# Patient Record
Sex: FEMALE | Race: WHITE
[De-identification: names, ages, dates, MRNs, and addresses within clinical notes are randomized per-mention and may not be internally consistent; named-entity substitution may affect disease eponyms.]

---

## 2018-01-24 ENCOUNTER — HOSPITAL ENCOUNTER (INPATIENT)
Dept: HOSPITAL 96 - M.ERS | Age: 38
LOS: 2 days | Discharge: HOME | DRG: 392 | End: 2018-01-26
Attending: SURGERY | Admitting: SURGERY
Payer: MEDICAID

## 2018-01-24 VITALS — SYSTOLIC BLOOD PRESSURE: 152 MMHG | DIASTOLIC BLOOD PRESSURE: 102 MMHG

## 2018-01-24 VITALS — DIASTOLIC BLOOD PRESSURE: 79 MMHG | SYSTOLIC BLOOD PRESSURE: 146 MMHG

## 2018-01-24 VITALS — DIASTOLIC BLOOD PRESSURE: 82 MMHG | SYSTOLIC BLOOD PRESSURE: 133 MMHG

## 2018-01-24 VITALS — SYSTOLIC BLOOD PRESSURE: 134 MMHG | DIASTOLIC BLOOD PRESSURE: 79 MMHG

## 2018-01-24 VITALS — DIASTOLIC BLOOD PRESSURE: 70 MMHG | SYSTOLIC BLOOD PRESSURE: 132 MMHG

## 2018-01-24 VITALS — HEIGHT: 65.98 IN | BODY MASS INDEX: 36 KG/M2 | WEIGHT: 224 LBS

## 2018-01-24 VITALS — DIASTOLIC BLOOD PRESSURE: 102 MMHG | SYSTOLIC BLOOD PRESSURE: 152 MMHG

## 2018-01-24 DIAGNOSIS — K63.5: ICD-10-CM

## 2018-01-24 DIAGNOSIS — F17.210: ICD-10-CM

## 2018-01-24 DIAGNOSIS — N83.202: ICD-10-CM

## 2018-01-24 DIAGNOSIS — Z90.49: ICD-10-CM

## 2018-01-24 DIAGNOSIS — K52.9: Primary | ICD-10-CM

## 2018-01-24 DIAGNOSIS — K64.8: ICD-10-CM

## 2018-01-24 DIAGNOSIS — K63.89: ICD-10-CM

## 2018-01-24 DIAGNOSIS — K37: ICD-10-CM

## 2018-01-24 DIAGNOSIS — D27.0: ICD-10-CM

## 2018-01-24 LAB
ABSOLUTE BASOPHILS: 0.1 THOU/UL (ref 0–0.2)
ABSOLUTE EOSINOPHILS: 0.2 THOU/UL (ref 0–0.7)
ABSOLUTE MONOCYTES: 0.6 THOU/UL (ref 0–1.2)
ALBUMIN SERPL-MCNC: 2.7 G/DL (ref 3.4–5)
ALP SERPL-CCNC: 81 U/L (ref 46–116)
ALT SERPL-CCNC: 23 U/L (ref 30–65)
AMP/METHAMP: POSITIVE
ANION GAP SERPL CALC-SCNC: 6 MMOL/L (ref 7–16)
AST SERPL-CCNC: 14 U/L (ref 15–37)
BASOPHILS NFR BLD AUTO: 0.9 %
BILIRUB SERPL-MCNC: 0.4 MG/DL
BILIRUB UR-MCNC: NEGATIVE MG/DL
BUN SERPL-MCNC: 13 MG/DL (ref 7–18)
CALCIUM SERPL-MCNC: 8.6 MG/DL (ref 8.5–10.1)
CHLORIDE SERPL-SCNC: 102 MMOL/L (ref 98–107)
CO2 SERPL-SCNC: 29 MMOL/L (ref 21–32)
COLOR UR: YELLOW
CREAT SERPL-MCNC: 0.7 MG/DL (ref 0.6–1.3)
EOSINOPHIL NFR BLD: 1.8 %
GLUCOSE SERPL-MCNC: 169 MG/DL (ref 70–99)
GRANULOCYTES NFR BLD MANUAL: 72.5 %
HCT VFR BLD CALC: 39.3 % (ref 37–47)
HGB BLD-MCNC: 13.9 GM/DL (ref 12–15)
KETONES UR STRIP-MCNC: NEGATIVE MG/DL
LIPASE: 392 U/L (ref 73–393)
LYMPHOCYTES # BLD: 2.1 THOU/UL (ref 0.8–5.3)
LYMPHOCYTES NFR BLD AUTO: 19.3 %
MCH RBC QN AUTO: 30.5 PG (ref 26–34)
MCHC RBC AUTO-ENTMCNC: 35.3 G/DL (ref 28–37)
MCV RBC: 86.4 FL (ref 80–100)
MONOCYTES NFR BLD: 5.5 %
MPV: 6.6 FL. (ref 7.2–11.1)
NEUTROPHILS # BLD: 7.7 THOU/UL (ref 1.6–8.1)
NUCLEATED RBCS: 0 /100WBC
PLATELET COUNT*: 405 THOU/UL (ref 150–400)
POTASSIUM SERPL-SCNC: 3.9 MMOL/L (ref 3.5–5.1)
PROT SERPL-MCNC: 7 G/DL (ref 6.4–8.2)
PROT UR QL STRIP: NEGATIVE
RBC # BLD AUTO: 4.56 MIL/UL (ref 4.2–5)
RBC # UR STRIP: NEGATIVE /UL
RDW-CV: 12.9 % (ref 10.5–14.5)
SODIUM SERPL-SCNC: 137 MMOL/L (ref 136–145)
SP GR UR STRIP: >= 1.03 (ref 1–1.03)
URINE CLARITY: CLEAR
URINE GLUCOSE-RANDOM: NEGATIVE
URINE LEUKOCYTES-REFLEX: NEGATIVE
URINE NITRITE-REFLEX: NEGATIVE
UROBILINOGEN UR STRIP-ACNC: 0.2 E.U./DL (ref 0.2–1)
WBC # BLD AUTO: 10.7 THOU/UL (ref 4–11)

## 2018-01-24 NOTE — NUR
PT ADMITTED FROM ED WITH ABD PAIN, C/O HEADACHE AND NAUSEA. PT DOES NOT WANT
TO HAVE DILAUDID ANYMORE. PT DOES NOT LIKE THE WAY IT MAKES HER FEEL.  DENIES
ABD PAIN CURRENTLY.

## 2018-01-24 NOTE — PROC
[address]  [address]                    PROCEDURE REPORT              
_______________________________________________________________________________
 
Name:       YESI TOBIN           Room:           [address] [place]  
M.R.#:  [id number]      Account #:      [id number]  
Admission:  01/24/18     Attend Phys:    Tete Young DO
Discharge:  01/26/18     Date of Birth:  [birth year]  
         Report #: [id number]
                                                                                
_______________________________________________________________________________
THIS REPORT FOR:  //name//                      
 
For details of GI report, please see the Provation report in Perceptive 7
content.
 
 
 
 
 
 
 
 
 
 
 
 
 
 
 
 
 
 
 
 
 
 
 
 
 
 
 
 
 
 
 
 
 
 
 
 
 
 
 
 
                       
                                        By:                                
                 
D: 01/26/18     _______________________________________
T: 01/29/18 0611Medical Records Staff KP       /AL

## 2018-01-25 VITALS — DIASTOLIC BLOOD PRESSURE: 73 MMHG | SYSTOLIC BLOOD PRESSURE: 132 MMHG

## 2018-01-25 VITALS — SYSTOLIC BLOOD PRESSURE: 129 MMHG | DIASTOLIC BLOOD PRESSURE: 67 MMHG

## 2018-01-25 LAB
ANION GAP SERPL CALC-SCNC: 4 MMOL/L (ref 7–16)
BUN SERPL-MCNC: 11 MG/DL (ref 7–18)
CALCIUM SERPL-MCNC: 8.9 MG/DL (ref 8.5–10.1)
CHLORIDE SERPL-SCNC: 102 MMOL/L (ref 98–107)
CO2 SERPL-SCNC: 33 MMOL/L (ref 21–32)
CREAT SERPL-MCNC: 0.7 MG/DL (ref 0.6–1.3)
GLUCOSE SERPL-MCNC: 110 MG/DL (ref 70–99)
HCT VFR BLD CALC: 40.5 % (ref 37–47)
HGB BLD-MCNC: 13.7 GM/DL (ref 12–15)
MCH RBC QN AUTO: 30 PG (ref 26–34)
MCHC RBC AUTO-ENTMCNC: 33.7 G/DL (ref 28–37)
MCV RBC: 89 FL (ref 80–100)
MPV: 6.7 FL. (ref 7.2–11.1)
PLATELET COUNT*: 407 THOU/UL (ref 150–400)
POTASSIUM SERPL-SCNC: 4.4 MMOL/L (ref 3.5–5.1)
RBC # BLD AUTO: 4.55 MIL/UL (ref 4.2–5)
RDW-CV: 12.8 % (ref 10.5–14.5)
SODIUM SERPL-SCNC: 139 MMOL/L (ref 136–145)
WBC # BLD AUTO: 8.3 THOU/UL (ref 4–11)

## 2018-01-25 NOTE — NUR
ASSUMED CARE OF PATIENT AFTER REPORT.  PATIENT AWAKE, ALERT, AND ORIENTED
APPROPRIATELY.  PHYSICAL ASSESSMENT COMPLETED AND AS CHARTED.  NO COMPLAINTS
OF PAIN.  GIVEN SCHEDULED MEDICATIONS, SEE EMAR FOR DOCUMENTATION.  VITAL
SIGNS STABLE.  OXYGEN SATURATION WNL ON ROOM AIR.  PATIENT IS UP AD MAYNOR.  USES
CALL LIGHT APPROPRIATELY.  DENIES NEEDS AT THIS TIME.  NURSING WILL CONTINUE
TO MONITOR.

## 2018-01-25 NOTE — NUR
ASSUMED CARE OF PATIENT AFTER TRNASFER OF CARE AT APPROXIMATELY 1530. AGREE
WITH PREVIOUS NURSES ASSESSMENT. PATIENT REMAINS ALERT AND ORIENTED. NO
COMPLAINTS OF NAUSEA OR PAIN. HOURLY ROUNDS MAINTAINED. CALL LIGHT IS WITHIN
REACH. NURSING WILL CONTINUE TO MONITOR.

## 2018-01-25 NOTE — NUR
PATIENT ALERT AND ORIENTED. VITALS STABLE. RA. DENIES ABDOMINAL PAIN.
COMPLAINTS OF MILD NAUSEA. FLUIDS INFUSING PER ORDER. SLEPT COMFORTABLY DURING
THE NIGHT. TORADOL GIVEN FOR A HEADACHE. HOURLY ROUNDS. NURSING WILL CONTINUE
TO MONITOR

## 2018-01-26 VITALS — SYSTOLIC BLOOD PRESSURE: 120 MMHG | DIASTOLIC BLOOD PRESSURE: 73 MMHG

## 2018-01-26 VITALS — SYSTOLIC BLOOD PRESSURE: 138 MMHG | DIASTOLIC BLOOD PRESSURE: 73 MMHG

## 2018-01-26 VITALS — DIASTOLIC BLOOD PRESSURE: 67 MMHG | SYSTOLIC BLOOD PRESSURE: 116 MMHG

## 2018-01-26 VITALS — DIASTOLIC BLOOD PRESSURE: 73 MMHG | SYSTOLIC BLOOD PRESSURE: 138 MMHG

## 2018-01-26 LAB
ANION GAP SERPL CALC-SCNC: 7 MMOL/L (ref 7–16)
BUN SERPL-MCNC: 6 MG/DL (ref 7–18)
CALCIUM SERPL-MCNC: 8.2 MG/DL (ref 8.5–10.1)
CHLORIDE SERPL-SCNC: 102 MMOL/L (ref 98–107)
CO2 SERPL-SCNC: 28 MMOL/L (ref 21–32)
CREAT SERPL-MCNC: 0.7 MG/DL (ref 0.6–1.3)
GLUCOSE SERPL-MCNC: 118 MG/DL (ref 70–99)
POTASSIUM SERPL-SCNC: 3.7 MMOL/L (ref 3.5–5.1)
SODIUM SERPL-SCNC: 137 MMOL/L (ref 136–145)

## 2018-01-26 PROCEDURE — 0DBN8ZZ EXCISION OF SIGMOID COLON, VIA NATURAL OR ARTIFICIAL OPENING ENDOSCOPIC: ICD-10-PCS | Performed by: INTERNAL MEDICINE

## 2018-01-26 NOTE — NUR
ASSUMED CARE OF PATIENT AFTER MORNING REPORT. ALERT AND ORIENTED X4.
ASSESSMENT COMPLETED AND AS CHARTED. VSS ON ROOM AIR. PATIENT HAD NO
COMPLAINTS OF PAIN OR NAUSEA THIS SHIFT. COLONOSCOPY DONE THIS MORNING.
PATIENT ADVANCED TO REGULAR DIET AND TOLERATED WELL. PATIENT DISCHARGED AT
1710 WITH SPOUSE. ALL PERSONAL BELONGINGS LEFT WITH PATIENT. DISCHARGE
INSTRUCTIONS AND PRESCRIPTIONS SENT WITH PATIENT UPON DISCHARGE.

## 2018-01-26 NOTE — NUR
PATIENT SLEPT FOR MOST OF SHIFT. ORIENTED X4 ON HOURLY ROUNDS. HAD TO
ENCOURAGE PATIENT TO STAY AWAKE TO COMPLETE BOWEL PREP. SHE STATED THAT "IT
DOESN'T TASTE GOOD AND MAKING ME SICK AT MY STOMACH." BOWELS NOT YET CLEAR
THIS AM. PHYSICIAN TO BE NOTIFIED. UP AD MAYNOR. IN ROOM. DENIES PAIN. VITALS
STABLE. WILL CONTINUE TO MONITOR.

## 2018-01-26 NOTE — NUR
PATIENT ARRIVED BACK ON THE UNIT FROM PACU AT APPOXIMATELY 1100. ALERT AND
ORIENTED. VSS ON ROOM AIR. PATIENT IS TO REMAIN NPO, CONTRAST WAS BROUGHT UP
BY RADIOLOGY FOR UPCOMING CT SCAN. PATIENT IS CURRENTLY WORKING ON CONSUMING
CONTRAST. CALL LIGHT IS WITHIN REACH. NURSING WILL CONTINUE TO MONITOR.

## 2018-01-29 NOTE — S
[address]  [address]                    SURGICAL PATH RPT PROCEDURE   
_______________________________________________________________________________
 
Name:       SHANON TOBIN           Room:           [address] [place]  
M.R.#:  [id number]      Account #:      [id number]  
Admission:  01/24/18     Date of Birth:  [birth year]  
Discharge:  01/26/18                   Report #:    [id number]
                                                         Path Case #: [id number]  
_______________________________________________________________________________
 
  
PATHOLOGY REPORT 
    
COLLECTION DATE: 1/26/2018   RECEIVED DATE: 1/26/2018  
 SUBMITTING PHYS: Dr. Chayito Suarez
OTHER PHYS:
Dr. Tete Saleh
   
SPECIMEN(S) RECEIVED:
A.Sigmoid polyp
    
* * * * * * * * * * * *
   
FINAL DIAGNOSIS:
Sigmoid colon polyp:
- Hyperplastic polyp.  
(TAMMY:[facility]; 01/29/2018)  
 
PATHOLOGIST:   Cade Souza M.D. 
REPORT ELECTRONICALLY SIGNED BY:   Cade Souza M.D.
DATE/TIME:   1/29/2018 12:56
    
* * * * * * * * * * * *
 
GROSS PATHOLOGY:
Received in formalin labeled "Shanon Tobin sigmoid colon polyp,"
is a segment of tan soft tissue measuring 0.4 cm in maximum
dimension.  The specimen is submitted entirely in cassette A1.
([facility]; 1/26/2018)
 
 
CLINICAL HISTORY:
None provided
 
INITIAL CPT CODE(S):
A; 87746
Professional services performed by [facility] at [facility], [facility] [address], [place] [address].  Technical
services performed by [facility] at [address] [place] [address], [address],
[place] [address].
 
 
   
[facility]
[address] [address]
PHONE:  [phone]
 
[place] [address]                    SURGICAL PATH RPT PROCEDURE   
_______________________________________________________________________________
 
Name:       SHANON TOBIN           Room:           [address] [place]  
M.R.#:  [id number]      Account #:      [id number]  
Admission:  01/24/18     Date of Birth:  [birth year]  
Discharge:  01/26/18                   Report #:    [id number]
                                                         Path Case #: [id number]  
_______________________________________________________________________________
DIRECTOR:  Spencer W. Kerley, M.D.
* * *  END OF REPORT  * * *

## 2018-01-31 NOTE — CON
[address]  [address]                    CONSULTATION                  
_______________________________________________________________________________
 
Name:       YESI TOBIN           Room:           [address] [place]  
M.R.#:  [id number]      Account #:      [id number]  
Admission:  01/24/18     Attend Phys:    Tete Young DO
Discharge:  01/26/18     Date of Birth:  [birth year]  
         Report #: [id number]
                                                                     [id number]  
_______________________________________________________________________________
THIS REPORT FOR:  //name//                      
 
CC: Tete Saleh
 
DATE OF SERVICE:  01/25/2018
 
 
ADDENDUM
 
This is a 37-year-old female with no previous history of GI issues who except
she remembers that 3 years ago, she was told she had colitis per CT.  She has
been having abdominal pain, which has been generalized.  She denies any
diarrhea, constipation, hematochezia, melena, nausea, vomiting or dyspepsia. 
Upon admission, the patient found to have stranding and inflammation in the
pelvis secondary to distal small bowel thickening.  The appendix appeared
normal.  The patient also found to have right ovarian or adnexal mass with fat
and calcification consistent with ___.
 
The patient does not have any leukocytosis and currently her abdomen is soft,
nontender, nondistended.
 
This is most probably secondary to infectious enteritis.  Since the patient has
had history of colitis 3 years ago, we will perform a colonoscopy and look at
the distal ileum.
 
 
 
 
 
 
 
 
 
 
 
 
 
 
 
 
 
 
 
<ELECTRONICALLY SIGNED>
                                        By:  Chayito Suarez MD            
01/31/18     1613
D: 01/25/18 1638_______________________________________
T: 01/25/18 1918Chayito Suarez MD               /nt

## 2019-12-12 ENCOUNTER — HOSPITAL ENCOUNTER (EMERGENCY)
Dept: HOSPITAL 61 - ER | Age: 39
Discharge: HOME | End: 2019-12-12
Payer: COMMERCIAL

## 2019-12-12 VITALS — SYSTOLIC BLOOD PRESSURE: 137 MMHG | DIASTOLIC BLOOD PRESSURE: 82 MMHG

## 2019-12-12 VITALS — BODY MASS INDEX: 33.66 KG/M2 | WEIGHT: 190 LBS | HEIGHT: 63 IN

## 2019-12-12 DIAGNOSIS — O03.9: Primary | ICD-10-CM

## 2019-12-12 DIAGNOSIS — Z3A.00: ICD-10-CM

## 2019-12-12 LAB
ALBUMIN SERPL-MCNC: 3.8 G/DL (ref 3.4–5)
ALBUMIN/GLOB SERPL: 1.1 {RATIO} (ref 1–1.7)
ALP SERPL-CCNC: 70 U/L (ref 46–116)
ALT SERPL-CCNC: 26 U/L (ref 14–59)
AMORPH SED URNS QL MICRO: PRESENT /HPF
ANION GAP SERPL CALC-SCNC: 11 MMOL/L (ref 6–14)
ANISOCYTOSIS BLD QL SMEAR: (no result)
APTT PPP: YELLOW S
AST SERPL-CCNC: 38 U/L (ref 15–37)
BACTERIA #/AREA URNS HPF: (no result) /HPF
BASOPHILS # BLD AUTO: 0.1 X10^3/UL (ref 0–0.2)
BASOPHILS NFR BLD: 1 % (ref 0–3)
BILIRUB SERPL-MCNC: 0.2 MG/DL (ref 0.2–1)
BILIRUB UR QL STRIP: NEGATIVE
BUN SERPL-MCNC: 14 MG/DL (ref 7–20)
BUN/CREAT SERPL: 35 (ref 6–20)
CALCIUM SERPL-MCNC: 9.7 MG/DL (ref 8.5–10.1)
CHLORIDE SERPL-SCNC: 101 MMOL/L (ref 98–107)
CO2 SERPL-SCNC: 25 MMOL/L (ref 21–32)
CREAT SERPL-MCNC: 0.4 MG/DL (ref 0.6–1)
EOSINOPHIL NFR BLD: 0.2 X10^3/UL (ref 0–0.7)
EOSINOPHIL NFR BLD: 2 % (ref 0–3)
ERYTHROCYTE [DISTWIDTH] IN BLOOD BY AUTOMATED COUNT: 24.4 % (ref 11.5–14.5)
FIBRINOGEN PPP-MCNC: CLEAR MG/DL
GFR SERPLBLD BASED ON 1.73 SQ M-ARVRAT: 177.7 ML/MIN
GLOBULIN SER-MCNC: 3.6 G/DL (ref 2.2–3.8)
GLUCOSE SERPL-MCNC: 100 MG/DL (ref 70–99)
HCT VFR BLD CALC: 32.9 % (ref 36–47)
HGB BLD-MCNC: 10.4 G/DL (ref 12–15.5)
HYPOCHROMIA BLD QL SMEAR: (no result)
LYMPHOCYTES # BLD: 1.7 X10^3/UL (ref 1–4.8)
LYMPHOCYTES NFR BLD AUTO: 20 % (ref 24–48)
MCH RBC QN AUTO: 21 PG (ref 25–35)
MCHC RBC AUTO-ENTMCNC: 32 G/DL (ref 31–37)
MCV RBC AUTO: 68 FL (ref 79–100)
MICROCYTES BLD QL SMEAR: (no result)
MONO #: 0.9 X10^3/UL (ref 0–1.1)
MONOCYTES NFR BLD: 11 % (ref 0–9)
NEUT #: 5.8 X10^3/UL (ref 1.8–7.7)
NEUTROPHILS NFR BLD AUTO: 66 % (ref 31–73)
NITRITE UR QL STRIP: NEGATIVE
OVALOCYTES BLD QL SMEAR: (no result)
PH UR STRIP: 5.5 [PH]
PLATELET # BLD AUTO: 467 X10^3/UL (ref 140–400)
PLATELET # BLD EST: (no result) 10*3/UL
POLYCHROMASIA BLD QL SMEAR: SLIGHT
POTASSIUM SERPL-SCNC: 4.9 MMOL/L (ref 3.5–5.1)
PROT SERPL-MCNC: 7.4 G/DL (ref 6.4–8.2)
PROT UR STRIP-MCNC: NEGATIVE MG/DL
RBC # BLD AUTO: 4.85 X10^6/UL (ref 3.5–5.4)
RBC #/AREA URNS HPF: 0 /HPF (ref 0–2)
SODIUM SERPL-SCNC: 137 MMOL/L (ref 136–145)
SQUAMOUS #/AREA URNS LPF: (no result) /LPF
UROBILINOGEN UR-MCNC: 0.2 MG/DL
WBC # BLD AUTO: 8.7 X10^3/UL (ref 4–11)
WBC #/AREA URNS HPF: (no result) /HPF (ref 0–4)

## 2019-12-12 PROCEDURE — 36415 COLL VENOUS BLD VENIPUNCTURE: CPT

## 2019-12-12 PROCEDURE — 84702 CHORIONIC GONADOTROPIN TEST: CPT

## 2019-12-12 PROCEDURE — 81025 URINE PREGNANCY TEST: CPT

## 2019-12-12 PROCEDURE — 76817 TRANSVAGINAL US OBSTETRIC: CPT

## 2019-12-12 PROCEDURE — 80053 COMPREHEN METABOLIC PANEL: CPT

## 2019-12-12 PROCEDURE — 86900 BLOOD TYPING SEROLOGIC ABO: CPT

## 2019-12-12 PROCEDURE — 86901 BLOOD TYPING SEROLOGIC RH(D): CPT

## 2019-12-12 PROCEDURE — 85025 COMPLETE CBC W/AUTO DIFF WBC: CPT

## 2019-12-12 PROCEDURE — 76801 OB US < 14 WKS SINGLE FETUS: CPT

## 2019-12-12 PROCEDURE — 81001 URINALYSIS AUTO W/SCOPE: CPT

## 2019-12-12 NOTE — PHYS DOC
Adult General


Chief Complaint


Chief Complaint:  VAGINAL BLEEDING PREGNANCY





HPI


HPI





Patient is a 39-year-old female who presents from group home with report of vaginal 

spotting and pregnancy. Patient had found out she was pregnant just recently and

had gone to  clinic on Monday and was given pills for . Patient 

notes that her hCG has actually gone up since taking the  pills and she 

is concerned that she may still be pregnant. She denies any actual pelvic pain 

or cramping.[]





Review of Systems


Review of Systems





Constitutional: Denies fever or chills []


Respiratory: Denies cough or shortness of breath []


Cardiovascular: No additional information not addressed in HPI []


GI: Denies abdominal pain, nausea, vomiting or diarrhea []


: Denies dysuria or hematuria. Complains of vaginal spotting. []


Neurologic: Denies headache, focal weakness or sensory changes []





All other systems were reviewed and found to be within normal limits, except as 

documented in this note.





Allergies


Allergies





Allergies








Coded Allergies Type Severity Reaction Last Updated Verified


 


  No Known Drug Allergies    19 No











Physical Exam


Physical Exam





Constitutional: Well developed, well nourished, no acute distress, non-toxic 

appearance. []


HENT: Normocephalic, atraumatic, bilateral external ears normal, oropharynx juanito

st, no oral exudates, nose normal. []


Eyes: PERRLA, EOMI, conjunctiva normal, no discharge. [] 


Neck: Normal range of motion, no tenderness, supple. [] 


Cardiovascular: Regular rate and rhythm[]


Lungs & Thorax:  Bilateral breath sounds clear to auscultation []


Abdomen: Bowel sounds normal, soft, no tenderness. [] 


Skin: Warm, dry, no erythema, no rash. [] 


Extremities: No tenderness, no cyanosis, no clubbing, ROM intact. [] 


Neurologic: Alert and oriented X 3, no focal deficits noted. []





Current Patient Data


Vital Signs





                                   Vital Signs








  Date Time  Temp Pulse Resp B/P (MAP) Pulse Ox O2 Delivery O2 Flow Rate FiO2


 


19 13:50 97.6 93 18 154/72 (99) 100 Room Air  





 97.6       








Lab Values





                                Laboratory Tests








Test


 19


13:47 19


13:49 19


14:00


 


Urine Collection Type Unknown    


 


Urine Color Yellow    


 


Urine Clarity Clear    


 


Urine pH 5.5    


 


Urine Specific Gravity 1.025    


 


Urine Protein


 Negative mg/dL


(NEG-TRACE) 


 





 


Urine Glucose (UA)


 Negative mg/dL


(NEG) 


 





 


Urine Ketones (Stick)


 Trace mg/dL


(NEG) 


 





 


Urine Blood Trace (NEG)    


 


Urine Nitrite


 Negative (NEG)


 


 





 


Urine Bilirubin


 Negative (NEG)


 


 





 


Urine Urobilinogen Dipstick


 0.2 mg/dL (0.2


mg/dL) 


 





 


Urine Leukocyte Esterase


 Negative (NEG)


 


 





 


Urine RBC 0 /HPF (0-2)    


 


Urine WBC


 Occ /HPF (0-4)


 


 





 


Urine Squamous Epithelial


Cells Mod /LPF  


 


 





 


Urine Amorphous Sediment Present /HPF    


 


Urine Bacteria


 Few /HPF


(0-FEW) 


 





 


Urine Mucus Marked /LPF    


 


POC Urine HCG, Qualitative


 


 Hcg positive


(Negative) 





 


White Blood Count


 


 


 8.7 x10^3/uL


(4.0-11.0)


 


Red Blood Count


 


 


 4.85 x10^6/uL


(3.50-5.40)


 


Hemoglobin


 


 


 10.4 g/dL


(12.0-15.5)  L


 


Hematocrit


 


 


 32.9 %


(36.0-47.0)  L


 


Mean Corpuscular Volume


 


 


 68 fL ()


L


 


Mean Corpuscular Hemoglobin


 


 


 21 pg (25-35)


L


 


Mean Corpuscular Hemoglobin


Concent 


 


 32 g/dL


(31-37)


 


Red Cell Distribution Width


 


 


 24.4 %


(11.5-14.5)  H


 


Platelet Count


 


 


 467 x10^3/uL


(140-400)  H


 


Neutrophils (%) (Auto)   66 % (31-73)  


 


Lymphocytes (%) (Auto)   20 % (24-48)  L


 


Monocytes (%) (Auto)   11 % (0-9)  H


 


Eosinophils (%) (Auto)   2 % (0-3)  


 


Basophils (%) (Auto)   1 % (0-3)  


 


Neutrophils # (Auto)


 


 


 5.8 x10^3/uL


(1.8-7.7)


 


Lymphocytes # (Auto)


 


 


 1.7 x10^3/uL


(1.0-4.8)


 


Monocytes # (Auto)


 


 


 0.9 x10^3/uL


(0.0-1.1)


 


Eosinophils # (Auto)


 


 


 0.2 x10^3/uL


(0.0-0.7)


 


Basophils # (Auto)


 


 


 0.1 x10^3/uL


(0.0-0.2)


 


Platelet Estimate   Pending  


 


Maternal Serum HCG Beta


Subunit 


 


 171 mIU/mL


(0-5)  H


 


Sodium Level


 


 


 137 mmol/L


(136-145)


 


Potassium Level


 


 


 4.9 mmol/L


(3.5-5.1)


 


Chloride Level


 


 


 101 mmol/L


()


 


Carbon Dioxide Level


 


 


 25 mmol/L


(21-32)


 


Anion Gap   11 (6-14)  


 


Blood Urea Nitrogen


 


 


 14 mg/dL


(7-20)


 


Creatinine


 


 


 0.4 mg/dL


(0.6-1.0)  L


 


Estimated GFR


(Cockcroft-Gault) 


 


 177.7  





 


BUN/Creatinine Ratio   35 (6-20)  H


 


Glucose Level


 


 


 100 mg/dL


(70-99)  H


 


Calcium Level


 


 


 9.7 mg/dL


(8.5-10.1)


 


Total Bilirubin


 


 


 0.2 mg/dL


(0.2-1.0)


 


Aspartate Amino Transferase


(AST) 


 


 38 U/L (15-37)


H


 


Alanine Aminotransferase (ALT)


 


 


 26 U/L (14-59)





 


Alkaline Phosphatase


 


 


 70 U/L


()


 


Total Protein


 


 


 7.4 g/dL


(6.4-8.2)


 


Albumin


 


 


 3.8 g/dL


(3.4-5.0)


 


Albumin/Globulin Ratio   1.1 (1.0-1.7)  





                                Laboratory Tests


19 14:00








                                Laboratory Tests


19 14:00














EKG


EKG


[]





Radiology/Procedures


Radiology/Procedures


[]


Impressions:


PROCEDURE: OB <14 WKS W/TV





OB <14 WKS W/TV


 


History: Vaginal bleeding. Pregnant.


 


Comparison: None. 


 


Technique: Grayscale and color Doppler imaging of the pelvis was performed


using transabdominal and transvaginal  technique.


 


Findings:


The uterus measures 9.0 x 6.5 x 6.0 cm in length. Nabothian cysts noted 


within the cervix.


 


No evidence of intrauterine gestational sac. Thickened heterogeneous 


endometrium measures up to 1.8 cm.


 


Right ovary measures 3.3 x 2.7 x 2.2 cm and is unremarkable.  Left ovary 


measures 2.8 x 2.9 x 1.6 cm and is unremarkable.  No adnexal masses are 


seen.


 


IMPRESSION:


1.  No evidence of intrauterine gestational sac. Thickening and 


heterogeneous endometrium. Recommend clinical correlation and ultrasound 


follow-up if indicated.


 


Electronically signed by: Hernán Toledo DO (2019 4:19 PM) [facility]





Course & Med Decision Making


Course & Med Decision Making


Pertinent Labs and Imaging studies reviewed. (See chart for details)





[]





Dragon Disclaimer


Dragon Disclaimer


This electronic medical record was generated, in whole or in part, using a voice

 recognition dictation system.





Departure


Departure


Impression:  


   Primary Impression:  


    in first trimester


Disposition:  01 HOME, SELF-CARE


Condition:  STABLE


Referrals:  


NO PCP (PCP)


Patient Instructions:  Miscarriage











BRYCE MORRISSEY Jr. DO          Dec 12, 2019 14:14

## 2019-12-12 NOTE — RAD
OB <14 WKS W/TV

 

History: Vaginal bleeding. Pregnant.

 

Comparison: None. 

 

Technique: Grayscale and color Doppler imaging of the pelvis was performed

using transabdominal and transvaginal  technique.

 

Findings:

The uterus measures 9.0 x 6.5 x 6.0 cm in length. Nabothian cysts noted 

within the cervix.

 

No evidence of intrauterine gestational sac. Thickened heterogeneous 

endometrium measures up to 1.8 cm.

 

Right ovary measures 3.3 x 2.7 x 2.2 cm and is unremarkable.  Left ovary 

measures 2.8 x 2.9 x 1.6 cm and is unremarkable.  No adnexal masses are 

seen.

 

IMPRESSION:

1.  No evidence of intrauterine gestational sac. Thickening and 

heterogeneous endometrium. Recommend clinical correlation and ultrasound 

follow-up if indicated.

 

Electronically signed by: Hernán Toledo DO (12/12/2019 4:19 PM) [email or web address]